# Patient Record
Sex: FEMALE | Race: WHITE | URBAN - METROPOLITAN AREA
[De-identification: names, ages, dates, MRNs, and addresses within clinical notes are randomized per-mention and may not be internally consistent; named-entity substitution may affect disease eponyms.]

---

## 2020-11-17 ENCOUNTER — OFFICE VISIT (OUTPATIENT)
Dept: URBAN - METROPOLITAN AREA CLINIC 91 | Facility: CLINIC | Age: 45
End: 2020-11-17
Payer: COMMERCIAL

## 2020-11-17 PROCEDURE — 92012 INTRM OPH EXAM EST PATIENT: CPT | Performed by: SPECIALIST

## 2020-11-17 RX ORDER — DORZOLAMIDE HCL 20 MG/ML
2 % SOLUTION/ DROPS OPHTHALMIC
Qty: 1 | Refills: 3 | Status: INACTIVE
Start: 2020-11-17 | End: 2020-12-10

## 2020-11-17 ASSESSMENT — INTRAOCULAR PRESSURE
OD: 11
OS: 31

## 2020-11-17 NOTE — IMPRESSION/PLAN
Impression: Bilateral pinguecula: H11.153. Plan: For Pinguecula I have recommended UV Protection when outdoors, either a hat or UV rated sunglasses. I explained to patient that these areas are more easily irritated by wind and sun exposure. Patient should also use a good quality artificial tears as needed for irritation.

## 2020-11-17 NOTE — IMPRESSION/PLAN
Impression: Primary open-angle glaucoma, bilateral, mild stage: H40.1131. Plan: 500 OD and  for pach / D/c Lumigan. Continue Timolol BID OU and Latanoprost QHS OU. Add Dorzolomide OS BID. Discussed primary open angle glaucoma. I have stressed importance of patient compliance with follow-up appointments and using glaucoma drops as directed. I have explained the risk of irreversible vision loss and possible blindness associated with glaucoma. Will continue to monitor patient's glaucoma status with OCTg, visual field testing and fundus photoReturn in 6 weeks for IOP  check.

## 2020-11-30 ENCOUNTER — OFFICE VISIT (OUTPATIENT)
Dept: URBAN - METROPOLITAN AREA CLINIC 91 | Facility: CLINIC | Age: 45
End: 2020-11-30
Payer: COMMERCIAL

## 2020-11-30 DIAGNOSIS — H40.1131 PRIMARY OPEN-ANGLE GLAUCOMA, BILATERAL, MILD STAGE: Primary | ICD-10-CM

## 2020-11-30 PROCEDURE — 99213 OFFICE O/P EST LOW 20 MIN: CPT | Performed by: SPECIALIST

## 2020-11-30 ASSESSMENT — INTRAOCULAR PRESSURE
OS: 29
OD: 11

## 2020-11-30 NOTE — IMPRESSION/PLAN
Impression: Primary open-angle glaucoma, bilateral, mild stage: H40.1131. Plan: Due to elevated IOP start Rocklatan QHS. Return in 10 days for an IOP check. Continue Tmolol and Dorzolmide as prior. Discussed primary open angle glaucoma. I have stressed importance of patient compliance with follow-up appointments and using glaucoma drops as directed. I have explained the risk of irreversible vision loss and possible blindness associated with glaucoma. Will continue to monitor patient's glaucoma status with OCTg, visual field testing and fundus photography.

## 2020-11-30 NOTE — IMPRESSION/PLAN
Impression: Pinguecula, bilateral: H11.153. Plan: For Pinguecula I have recommended UV Protection when outdoors, either a hat or UV rated sunglasses. I explained to patient that these areas are more easily irritated by wind and sun exposure. Patient should also use a good quality artificial tears as needed for irritation.

## 2020-12-10 ENCOUNTER — OFFICE VISIT (OUTPATIENT)
Dept: URBAN - METROPOLITAN AREA CLINIC 91 | Facility: CLINIC | Age: 45
End: 2020-12-10
Payer: COMMERCIAL

## 2020-12-10 PROCEDURE — 99213 OFFICE O/P EST LOW 20 MIN: CPT | Performed by: SPECIALIST

## 2020-12-10 RX ORDER — NETARSUDIL AND LATANOPROST OPHTHALMIC SOLUTION, 0.02%/0.005% .2; .05 MG/ML; MG/ML
SOLUTION/ DROPS OPHTHALMIC; TOPICAL
Qty: 7.5 | Refills: 3 | Status: INACTIVE
Start: 2020-12-10 | End: 2021-03-09

## 2020-12-10 RX ORDER — DORZOLAMIDE HCL 20 MG/ML
2 % SOLUTION/ DROPS OPHTHALMIC
Qty: 7.5 | Refills: 3 | Status: INACTIVE
Start: 2020-12-10 | End: 2021-03-09

## 2020-12-10 ASSESSMENT — INTRAOCULAR PRESSURE
OD: 13
OS: 14

## 2020-12-10 NOTE — IMPRESSION/PLAN
Impression: Primary open-angle glaucoma, bilateral, mild stage: H40.1131. Plan: Pressure has improved, instructed patient to continue using Timolol BID OU+Dorzolamide BID OS, Rocklatan QHS OU. Discussed primary open angle glaucoma. I have stressed importance of patient compliance with follow-up appointments and using glaucoma drops as directed. I have explained the risk of irreversible vision loss and possible blindness associated with glaucoma. Will continue to monitor patient's glaucoma status with OCTg, visual field testing and fundus photography. Follow up with Dr. Juventino Rodriguez in 6 weeks for IOP check.

## 2021-05-20 ENCOUNTER — OFFICE VISIT (OUTPATIENT)
Dept: URBAN - METROPOLITAN AREA CLINIC 91 | Facility: CLINIC | Age: 46
End: 2021-05-20
Payer: COMMERCIAL

## 2021-05-20 PROCEDURE — 99213 OFFICE O/P EST LOW 20 MIN: CPT | Performed by: SPECIALIST

## 2021-05-20 ASSESSMENT — INTRAOCULAR PRESSURE
OD: 12
OS: 30

## 2021-05-20 NOTE — IMPRESSION/PLAN
Impression: Primary open-angle glaucoma, bilateral, mild stage: H40.1131. Plan: Elevated IOP OS, unable to tolerate Racklatan recommend patient to use Racklatan until seen by Dr Dr. Payton Rodriguez glaucoma specialist. Continue Timolol BID OU. Discussed primary open angle glaucoma. I have stressed importance of patient compliance with follow-up appointments and using glaucoma drops as directed. I have explained the risk of irreversible vision loss and possible blindness associated with glaucoma. Will continue to monitor patient's glaucoma status with OCTg, visual field testing and fundus photography.

## 2021-06-22 ENCOUNTER — OFFICE VISIT (OUTPATIENT)
Dept: URBAN - METROPOLITAN AREA CLINIC 91 | Facility: CLINIC | Age: 46
End: 2021-06-22
Payer: COMMERCIAL

## 2021-06-22 PROCEDURE — 99213 OFFICE O/P EST LOW 20 MIN: CPT | Performed by: SPECIALIST

## 2021-06-22 ASSESSMENT — INTRAOCULAR PRESSURE
OS: 25
OD: 11

## 2021-06-22 NOTE — IMPRESSION/PLAN
Impression: Primary open-angle glaucoma, bilateral, mild stage: H40.1131. Plan: Patient has glaucoma sx schedule x1 week from now with Dr Lidia Menendez, IOP left today 25, ok for patient to proceed. Discuss with patient may need to see specialist at [facility] for further diagnosis OS. Recommend reading glasses OTC +1.25 or +1.50. Discussed primary open angle glaucoma. I have stressed importance of patient compliance with follow-up appointments and using glaucoma drops as directed. I have explained the risk of irreversible vision loss and possible blindness associated with glaucoma. Will continue to monitor patient's glaucoma status with OCTg, visual field testing and fundus photography.

## 2021-08-26 ENCOUNTER — OFFICE VISIT (OUTPATIENT)
Dept: URBAN - METROPOLITAN AREA CLINIC 91 | Facility: CLINIC | Age: 46
End: 2021-08-26
Payer: COMMERCIAL

## 2021-08-26 DIAGNOSIS — H40.89 OTHER SPECIFIED GLAUCOMA: Primary | ICD-10-CM

## 2021-08-26 PROCEDURE — 99212 OFFICE O/P EST SF 10 MIN: CPT | Performed by: SPECIALIST

## 2021-08-26 ASSESSMENT — INTRAOCULAR PRESSURE
OS: 23
OD: 12

## 2021-08-26 NOTE — IMPRESSION/PLAN
Impression: Other specified glaucoma: H40.89. Plan: IOP has improved compared to last office visit. 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 Continue dorzolamide-timolol BID OS and durezol 1x a day OS. Glaucoma is managed by Dr. Yoni Cabezas. Patient has an MRI scheduled and encouraged patient to continue care with Dr. Yoni Cabezas. Once able, recommend patient to be evaluated at [facility], patient declines at this time.

## 2021-10-26 ENCOUNTER — OFFICE VISIT (OUTPATIENT)
Dept: URBAN - METROPOLITAN AREA CLINIC 91 | Facility: CLINIC | Age: 46
End: 2021-10-26
Payer: COMMERCIAL

## 2021-10-26 DIAGNOSIS — H17.9 CORNEAL SCAR: ICD-10-CM

## 2021-10-26 PROCEDURE — 99214 OFFICE O/P EST MOD 30 MIN: CPT | Performed by: SPECIALIST

## 2021-10-26 ASSESSMENT — INTRAOCULAR PRESSURE
OS: 16
OD: 17

## 2021-10-26 NOTE — IMPRESSION/PLAN
Impression: Other specified glaucoma: H40.89. Plan: Discussed primary open angle glaucoma. I have stressed importance of patient compliance with follow-up appointments and using glaucoma drops as directed. I have explained the risk of irreversible vision loss and possible blindness associated with glaucoma. Will continue to monitor patient's glaucoma status with OCTg, visual field testing and fundus photography.

## 2021-10-26 NOTE — IMPRESSION/PLAN
Impression: Corneal scar: H17.9. Plan: Option RGP and PKP OS explained . Tried RGP before with no success.  Will wait 3 months for Valve Sx.

## 2022-01-25 ENCOUNTER — OFFICE VISIT (OUTPATIENT)
Dept: URBAN - METROPOLITAN AREA CLINIC 91 | Facility: CLINIC | Age: 47
End: 2022-01-25
Payer: COMMERCIAL

## 2022-01-25 DIAGNOSIS — H11.153 PINGUECULA, BILATERAL: ICD-10-CM

## 2022-01-25 DIAGNOSIS — H25.092 OTHER AGE-RELATED INCIPIENT CATARACT, LEFT EYE: ICD-10-CM

## 2022-01-25 PROCEDURE — 99214 OFFICE O/P EST MOD 30 MIN: CPT | Performed by: SPECIALIST

## 2022-01-25 PROCEDURE — 92025 CPTRIZED CORNEAL TOPOGRAPHY: CPT | Performed by: SPECIALIST

## 2022-01-25 RX ORDER — TIMOLOL MALEATE 5 MG/ML
0.5 % SOLUTION/ DROPS OPHTHALMIC
Qty: 0 | Refills: 0 | Status: ACTIVE
Start: 2022-01-25

## 2022-01-25 ASSESSMENT — INTRAOCULAR PRESSURE
OS: 20
OD: 13

## 2022-01-25 NOTE — IMPRESSION/PLAN
Impression: Corneal scar: H17.9. Plan: Pentacam reviewed and discussed thin cornea of OS, therefore explained that patient may need more visits and sutures. Explained 80% success rate to patient. Discussed corneal scarring and option of PKP. All alternatives to surgery such as observation and medical management were explained in detail. All risks and benefits associated with corneal transplant surgery such as infection, bleeding, retinal detachment, loss of eye or vision, development of glaucoma, poor visio post-operatively, need for prolonged use of medications, need for corrective lenses and possible rejection/failure of the transplant were explained in detail. Patient advised that this is not an emergent procedure, and they may delay the surgery if they wish. Patient has opted to undergo corneal transplant. Aditi Tonia will be performed in case of possible need for lens removal and/or IOL exchange at time of surgery.

## 2022-01-25 NOTE — IMPRESSION/PLAN
Impression: Other age-related incipient cataract, left eye: H25.092. Plan: Due to the fact that cataract OS is not affecting patient's vision in, I would not recommend surgical intervention at this time. Patient was advised to consider using UVB sunglasses when outdoors. We will consider cataract surgery at later time if patient's visual function no longer meets their needs or interferes with their daily activities.

## 2022-02-23 ENCOUNTER — SURGERY (OUTPATIENT)
Dept: URBAN - METROPOLITAN AREA EXTERNAL CLINIC 49 | Facility: EXTERNAL CLINIC | Age: 47
End: 2022-02-23
Payer: COMMERCIAL

## 2022-02-23 PROCEDURE — 65730 CORNEAL TRANSPLANT: CPT | Performed by: SPECIALIST

## 2022-02-24 ENCOUNTER — POST-OPERATIVE VISIT (OUTPATIENT)
Dept: URBAN - METROPOLITAN AREA CLINIC 91 | Facility: CLINIC | Age: 47
End: 2022-02-24
Payer: COMMERCIAL

## 2022-02-24 PROCEDURE — 99024 POSTOP FOLLOW-UP VISIT: CPT | Performed by: SPECIALIST

## 2022-02-24 NOTE — IMPRESSION/PLAN
Impression: S/P PKP OS - 1 Day. Encounter for surgical aftercare following surgery on a sense organ  Z48.810. Excellent post op course   Post operative instructions reviewed - Condition is improving - PKP OS. Plan: Patient is status post corneal transplant. I have discussed at length the risk of infection, inflammation and rejection with patient. I have stressed compliance with medications and close follow-up appointments. Patient was advised to use protective glasses and goggles at all times due to increased risk of wound dehiscence and ruptured globe.

## 2022-03-03 ENCOUNTER — OFFICE VISIT (OUTPATIENT)
Dept: URBAN - METROPOLITAN AREA CLINIC 91 | Facility: CLINIC | Age: 47
End: 2022-03-03
Payer: COMMERCIAL

## 2022-03-03 DIAGNOSIS — Z48.810 ENCOUNTER FOR SURGICAL AFTERCARE FOLLOWING SURGERY ON A SENSE ORGAN: Primary | ICD-10-CM

## 2022-03-03 PROCEDURE — 99024 POSTOP FOLLOW-UP VISIT: CPT | Performed by: SPECIALIST

## 2022-03-03 NOTE — IMPRESSION/PLAN
Impression: Corneal transplant status: Z94.7. Plan: Restart IOP gtts, Timolol BID OU. Cornea doing well, will continue to monitor.

## 2022-03-03 NOTE — IMPRESSION/PLAN
Impression: Encounter for surgical aftercare following surgery on a sense organ: Z48.810 OS.  Plan: No epi defect, patient healing well post PKP OS

## 2022-04-07 ENCOUNTER — OFFICE VISIT (OUTPATIENT)
Dept: URBAN - METROPOLITAN AREA CLINIC 91 | Facility: CLINIC | Age: 47
End: 2022-04-07
Payer: COMMERCIAL

## 2022-04-07 DIAGNOSIS — Z94.7 CORNEAL TRANSPLANT STATUS: Primary | ICD-10-CM

## 2022-04-07 PROCEDURE — 99024 POSTOP FOLLOW-UP VISIT: CPT | Performed by: SPECIALIST

## 2022-04-07 ASSESSMENT — INTRAOCULAR PRESSURE
OD: 12
OS: 16

## 2022-04-07 NOTE — IMPRESSION/PLAN
Impression: Corneal transplant status: Z94.7. Plan: S/P PKP OS doing well, emphasize importance of using PF gtts QID OS. Will continue to monitor.

## 2022-05-31 ENCOUNTER — OFFICE VISIT (OUTPATIENT)
Dept: URBAN - METROPOLITAN AREA CLINIC 91 | Facility: CLINIC | Age: 47
End: 2022-05-31
Payer: COMMERCIAL

## 2022-05-31 DIAGNOSIS — Z94.7 CORNEAL TRANSPLANT STATUS: Primary | ICD-10-CM

## 2022-05-31 DIAGNOSIS — H40.1131 PRIMARY OPEN-ANGLE GLAUCOMA, BILATERAL, MILD STAGE: ICD-10-CM

## 2022-05-31 PROCEDURE — 92025 CPTRIZED CORNEAL TOPOGRAPHY: CPT | Performed by: SPECIALIST

## 2022-05-31 PROCEDURE — 99213 OFFICE O/P EST LOW 20 MIN: CPT | Performed by: SPECIALIST

## 2022-05-31 RX ORDER — OFLOXACIN 3 MG/ML
0.3 % SOLUTION/ DROPS OPHTHALMIC
Qty: 5 | Refills: 0 | Status: ACTIVE
Start: 2022-05-31

## 2022-05-31 ASSESSMENT — INTRAOCULAR PRESSURE
OD: 10
OS: 24
OS: 36

## 2022-05-31 NOTE — IMPRESSION/PLAN
Impression: Primary open-angle glaucoma, bilateral, mild stage: H40.1131. Plan: IOP's stable OU, continue using Timolol BID OU. Discussed primary open angle glaucoma. I have stressed importance of patient compliance with follow-up appointments and using glaucoma drops as directed. I have explained the risk of irreversible vision loss and possible blindness associated with glaucoma. Will continue to monitor patient's glaucoma status with OCTg, visual field testing and fundus photography.

## 2022-05-31 NOTE — IMPRESSION/PLAN
Impression: Corneal transplant status: Z94.7. Plan: S/P PKP OS doing well, loose sutures removed today at slit lamp with no complications. Continue using pred gtts QID and start Ocuflox gtts QID OS x3-4 days then d/c. Will continue to observe.

## 2022-07-19 ENCOUNTER — OFFICE VISIT (OUTPATIENT)
Dept: URBAN - METROPOLITAN AREA CLINIC 91 | Facility: CLINIC | Age: 47
End: 2022-07-19
Payer: COMMERCIAL

## 2022-07-19 DIAGNOSIS — H40.1131 PRIMARY OPEN-ANGLE GLAUCOMA, BILATERAL, MILD STAGE: ICD-10-CM

## 2022-07-19 DIAGNOSIS — Z94.7 CORNEAL TRANSPLANT STATUS: Primary | ICD-10-CM

## 2022-07-19 PROCEDURE — 92025 CPTRIZED CORNEAL TOPOGRAPHY: CPT | Performed by: SPECIALIST

## 2022-07-19 PROCEDURE — 99213 OFFICE O/P EST LOW 20 MIN: CPT | Performed by: SPECIALIST

## 2022-07-19 RX ORDER — BIMATOPROST 0.1 MG/ML
0.01 % SOLUTION/ DROPS OPHTHALMIC
Qty: 7.5 | Refills: 3 | Status: INACTIVE
Start: 2022-07-19 | End: 2022-10-16

## 2022-07-19 ASSESSMENT — INTRAOCULAR PRESSURE
OS: 32
OD: 13

## 2022-07-19 NOTE — IMPRESSION/PLAN
Impression: Corneal transplant status: Z94.7.  Plan: Jung reviewed with no changes OU, due to high IOP reduce PF gtts to BID OS

## 2022-07-19 NOTE — IMPRESSION/PLAN
Impression: Primary open-angle glaucoma, bilateral, mild stage: H40.1131. Plan: IOP OS elevated, instructed patient to start Lumigan QHS OS and continue Timolol BID OU Discussed primary open angle glaucoma. I have stressed importance of patient compliance with follow-up appointments and using glaucoma drops as directed. I have explained the risk of irreversible vision loss and possible blindness associated with glaucoma. Will continue to monitor patient's glaucoma status with OCTg, visual field testing and fundus photography.

## 2022-07-21 ENCOUNTER — OFFICE VISIT (OUTPATIENT)
Dept: URBAN - METROPOLITAN AREA CLINIC 91 | Facility: CLINIC | Age: 47
End: 2022-07-21
Payer: COMMERCIAL

## 2022-07-21 PROCEDURE — 99213 OFFICE O/P EST LOW 20 MIN: CPT | Performed by: SPECIALIST

## 2022-07-21 ASSESSMENT — INTRAOCULAR PRESSURE
OS: 22
OD: 12

## 2022-07-21 NOTE — IMPRESSION/PLAN
Impression: Primary open-angle glaucoma, bilateral, mild stage: H40.1131. Plan:  IOP has improved, continue Lumigan QHS OS and continue Timolol BID OU.  Patien to see Dr. Dara Gunderson tomorrow

## 2022-07-21 NOTE — IMPRESSION/PLAN
Impression: Corneal transplant status: Z94.7. Plan: PKP OS clear, 3 few sutures removed today.  Instructed patient to start Pred QID for 5 days

## 2022-09-08 ENCOUNTER — OFFICE VISIT (OUTPATIENT)
Dept: URBAN - METROPOLITAN AREA CLINIC 91 | Facility: CLINIC | Age: 47
End: 2022-09-08
Payer: COMMERCIAL

## 2022-09-08 DIAGNOSIS — Z94.7 CORNEAL TRANSPLANT STATUS: Primary | ICD-10-CM

## 2022-09-08 DIAGNOSIS — H40.1131 PRIMARY OPEN-ANGLE GLAUCOMA, BILATERAL, MILD STAGE: ICD-10-CM

## 2022-09-08 PROCEDURE — 99213 OFFICE O/P EST LOW 20 MIN: CPT | Performed by: SPECIALIST

## 2022-09-08 PROCEDURE — 92025 CPTRIZED CORNEAL TOPOGRAPHY: CPT | Performed by: SPECIALIST

## 2022-09-08 ASSESSMENT — INTRAOCULAR PRESSURE
OS: 19
OD: 8

## 2022-09-08 NOTE — IMPRESSION/PLAN
Impression: Corneal transplant status: Z94.7. Plan: S/P PKP OS clear, yeni obtained and reviewed with patient, mild edema seen today, continue using Pred QID  OS. Recommend to see optom for contact lens fitting.

## 2022-09-08 NOTE — IMPRESSION/PLAN
Impression: Primary open-angle glaucoma, bilateral, mild stage: H40.1131. Plan: IOP's stable OU, continue using Lumigan QHS OS, Timolol BID OS, and Brimonidine BID OS. Discussed primary open angle glaucoma. I have stressed importance of patient compliance with follow-up appointments and using glaucoma drops as directed. I have explained the risk of irreversible vision loss and possible blindness associated with glaucoma. Will continue to monitor patient's glaucoma status with OCTg, visual field testing and fundus photography.

## 2022-10-20 ENCOUNTER — OFFICE VISIT (OUTPATIENT)
Dept: URBAN - METROPOLITAN AREA CLINIC 91 | Facility: CLINIC | Age: 47
End: 2022-10-20
Payer: COMMERCIAL

## 2022-10-20 DIAGNOSIS — Z94.7 CORNEAL TRANSPLANT STATUS: Primary | ICD-10-CM

## 2022-10-20 PROCEDURE — 99213 OFFICE O/P EST LOW 20 MIN: CPT | Performed by: SPECIALIST

## 2022-10-20 PROCEDURE — 92286 ANT SGM IMG I&R SPECLR MIC: CPT | Performed by: SPECIALIST

## 2022-10-20 ASSESSMENT — INTRAOCULAR PRESSURE
OS: 18
OD: 10

## 2022-10-20 NOTE — IMPRESSION/PLAN
Impression: Corneal transplant status: Z94.7. Plan: S/P PKP OS clear, ECC obtained and reviewed with patient, mild edema seen today, continue using Pred QID OS. Patient following with Dr Romario Guo for cataracts and has appointment with retina specialist next week. Will continue to observe.